# Patient Record
Sex: MALE | Race: ASIAN
[De-identification: names, ages, dates, MRNs, and addresses within clinical notes are randomized per-mention and may not be internally consistent; named-entity substitution may affect disease eponyms.]

---

## 2017-02-21 ENCOUNTER — IMPORTED ENCOUNTER (OUTPATIENT)
Age: 32
End: 2017-02-21

## 2017-02-21 PROCEDURE — 92310 CONTACT LENS FITTING OU: CPT

## 2017-02-21 PROCEDURE — 92004 COMPRE OPH EXAM NEW PT 1/>: CPT

## 2017-02-28 ENCOUNTER — IMPORTED ENCOUNTER (OUTPATIENT)
Age: 32
End: 2017-02-28

## 2017-02-28 PROCEDURE — 241 FIT SCL DW ONE-EYE

## 2017-02-28 PROCEDURE — 92015 DETERMINE REFRACTIVE STATE: CPT

## 2017-10-06 ASSESSMENT — TONOMETRY
OS_IOP_MMHG: 11
OD_IOP_MMHG: 10

## 2017-10-06 ASSESSMENT — VISUAL ACUITY
OD_CC: 20/J1+
OS_CC: 20/J1+
OD_CC: 20/20

## 2022-05-06 PROBLEM — Z00.00 ENCOUNTER FOR PREVENTIVE HEALTH EXAMINATION: Status: ACTIVE | Noted: 2022-05-06

## 2022-05-11 ENCOUNTER — APPOINTMENT (OUTPATIENT)
Dept: ORTHOPEDIC SURGERY | Facility: CLINIC | Age: 37
End: 2022-05-11
Payer: SELF-PAY

## 2022-05-11 VITALS — HEIGHT: 67 IN | BODY MASS INDEX: 25.9 KG/M2 | WEIGHT: 165 LBS

## 2022-05-11 DIAGNOSIS — Z78.9 OTHER SPECIFIED HEALTH STATUS: ICD-10-CM

## 2022-05-11 DIAGNOSIS — M65.332 TRIGGER FINGER, LEFT MIDDLE FINGER: ICD-10-CM

## 2022-05-11 DIAGNOSIS — M65.342 TRIGGER FINGER, LEFT RING FINGER: ICD-10-CM

## 2022-05-11 PROCEDURE — 99211 OFF/OP EST MAY X REQ PHY/QHP: CPT

## 2022-05-17 NOTE — PHYSICAL EXAM
[de-identified] : Pt has mild crepitus with flexion of the left ring finger.\par Full ROM of the left ring and middle fingers are noted.\par There is mild ttp over the 4th A1 pulley.\par All digits are nvi.\par  strength is 4/5.\par Intrinsic strength is 5/5.

## 2022-05-17 NOTE — HISTORY OF PRESENT ILLNESS
[5] : 5 [2] : 2 [Dull/Aching] : dull/aching [Intermittent] : intermittent [Rest] : rest [de-identified] : 5/11/2022: left hand s/p left middle finger trigger release (performed by Dr. De La Garza) and ring finger trigger release (performed by patient).\par Dr. Reed still has pain with flexion of the ring finger.\par \par  8/4/21: 1 w s/p perc TFR of the L MF, pain in the distal sheath, ok at a1 pulley\par 7/28/21: pt here today for the left middle finger. pt previously had trigger finger and has 2 CSI to this. recently was\par playing tennis and felt a pop in his finger and pain is worsening. pt states he had gotten xrays for this which were\par normal- does not have disc today. 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

## 2022-05-17 NOTE — ASSESSMENT
[FreeTextEntry1] : Pt provided paperwork for [facility]. WE discussed that given his suboptimal outcome from his perc release that he performed on himself, I would only be willign to perform open release of any scar tissue and would not offer further perc procedure for that finger\par Pt will rto prn.

## 2023-08-10 ENCOUNTER — APPOINTMENT (OUTPATIENT)
Dept: HUMAN REPRODUCTION | Facility: CLINIC | Age: 38
End: 2023-08-10
Payer: COMMERCIAL

## 2023-08-10 PROCEDURE — 89322 SEMEN ANAL STRICT CRITERIA: CPT
